# Patient Record
Sex: MALE | Race: WHITE | NOT HISPANIC OR LATINO | URBAN - NONMETROPOLITAN AREA
[De-identification: names, ages, dates, MRNs, and addresses within clinical notes are randomized per-mention and may not be internally consistent; named-entity substitution may affect disease eponyms.]

---

## 2016-11-11 PROBLEM — H52.223: Noted: 2017-11-10

## 2016-11-11 PROBLEM — H52.4: Noted: 2017-11-10

## 2019-10-18 ENCOUNTER — IMPORTED ENCOUNTER (OUTPATIENT)
Dept: URBAN - NONMETROPOLITAN AREA CLINIC 1 | Facility: CLINIC | Age: 59
End: 2019-10-18

## 2019-10-18 PROCEDURE — 92014 COMPRE OPH EXAM EST PT 1/>: CPT

## 2019-10-18 PROCEDURE — 92015 DETERMINE REFRACTIVE STATE: CPT

## 2020-10-16 ENCOUNTER — IMPORTED ENCOUNTER (OUTPATIENT)
Dept: URBAN - NONMETROPOLITAN AREA CLINIC 1 | Facility: CLINIC | Age: 60
End: 2020-10-16

## 2020-10-16 PROCEDURE — 92015 DETERMINE REFRACTIVE STATE: CPT

## 2020-10-16 PROCEDURE — 92014 COMPRE OPH EXAM EST PT 1/>: CPT

## 2021-10-18 ENCOUNTER — IMPORTED ENCOUNTER (OUTPATIENT)
Dept: URBAN - NONMETROPOLITAN AREA CLINIC 1 | Facility: CLINIC | Age: 61
End: 2021-10-18

## 2021-10-18 PROCEDURE — 92015 DETERMINE REFRACTIVE STATE: CPT

## 2021-10-18 PROCEDURE — 92014 COMPRE OPH EXAM EST PT 1/>: CPT

## 2021-10-18 NOTE — PATIENT DISCUSSION
Presbyopia-Discussed diagnosis with patient. Updated spec Rx given. Recommend lens that will provide comfort as well as protect safety and health of eyes. Astigmatism-Discussed diagnosis with patient. Updated spec Rx given. Recommend lens that will provide comfort as well as protect safety and health of eyes.

## 2022-04-10 ASSESSMENT — VISUAL ACUITY
OS_CC: 20/40
OU_CC: 20/30
OS_CC: J1
OS_SC: 20/20
OD_CC: 20/30
OD_CC: J1
OD_SC: 20/20
OD_CC: 20/30
OS_CC: 20/40

## 2022-04-10 ASSESSMENT — TONOMETRY
OS_IOP_MMHG: 17
OD_IOP_MMHG: 17
OS_IOP_MMHG: 17
OS_IOP_MMHG: 17
OD_IOP_MMHG: 17
OD_IOP_MMHG: 17

## 2022-10-19 ENCOUNTER — COMPREHENSIVE EXAM (OUTPATIENT)
Dept: RURAL CLINIC 1 | Facility: CLINIC | Age: 62
End: 2022-10-19

## 2022-10-19 DIAGNOSIS — H52.4: ICD-10-CM

## 2022-10-19 DIAGNOSIS — H52.223: ICD-10-CM

## 2022-10-19 PROCEDURE — 92014 COMPRE OPH EXAM EST PT 1/>: CPT

## 2022-10-19 PROCEDURE — 92015 DETERMINE REFRACTIVE STATE: CPT

## 2022-10-19 ASSESSMENT — VISUAL ACUITY
OD_SC: 20/25
OS_SC: 20/25
OD_SC: 20/40
OS_SC: 20/40
OU_SC: 20/40
OU_SC: 20/25

## 2022-10-19 ASSESSMENT — TONOMETRY
OD_IOP_MMHG: 17
OS_IOP_MMHG: 17

## 2023-11-28 ENCOUNTER — COMPREHENSIVE EXAM (OUTPATIENT)
Dept: RURAL CLINIC 1 | Facility: CLINIC | Age: 63
End: 2023-11-28

## 2023-11-28 DIAGNOSIS — H52.223: ICD-10-CM

## 2023-11-28 DIAGNOSIS — H52.4: ICD-10-CM

## 2023-11-28 PROCEDURE — 92014 COMPRE OPH EXAM EST PT 1/>: CPT

## 2023-11-28 PROCEDURE — 92015 DETERMINE REFRACTIVE STATE: CPT

## 2023-11-28 ASSESSMENT — TONOMETRY
OD_IOP_MMHG: 16
OS_IOP_MMHG: 17

## 2023-11-28 ASSESSMENT — VISUAL ACUITY
OS_CC: 20/20
OD_CC: 20/20

## 2024-12-03 ENCOUNTER — COMPREHENSIVE EXAM (OUTPATIENT)
Age: 64
End: 2024-12-03

## 2024-12-03 DIAGNOSIS — H52.4: ICD-10-CM

## 2024-12-03 DIAGNOSIS — H52.223: ICD-10-CM

## 2024-12-03 PROCEDURE — 92015 DETERMINE REFRACTIVE STATE: CPT

## 2024-12-03 PROCEDURE — 92014 COMPRE OPH EXAM EST PT 1/>: CPT
